# Patient Record
Sex: MALE | Race: WHITE | NOT HISPANIC OR LATINO | Employment: FULL TIME | URBAN - METROPOLITAN AREA
[De-identification: names, ages, dates, MRNs, and addresses within clinical notes are randomized per-mention and may not be internally consistent; named-entity substitution may affect disease eponyms.]

---

## 2020-01-08 ENCOUNTER — OFFICE VISIT (OUTPATIENT)
Dept: PRIMARY CARE CLINIC | Facility: CLINIC | Age: 30
End: 2020-01-08
Payer: COMMERCIAL

## 2020-01-08 VITALS
TEMPERATURE: 99 F | OXYGEN SATURATION: 99 % | HEART RATE: 72 BPM | RESPIRATION RATE: 18 BRPM | HEIGHT: 69 IN | DIASTOLIC BLOOD PRESSURE: 80 MMHG | SYSTOLIC BLOOD PRESSURE: 140 MMHG | BODY MASS INDEX: 34.96 KG/M2 | WEIGHT: 236 LBS

## 2020-01-08 DIAGNOSIS — E66.9 OBESITY, UNSPECIFIED CLASSIFICATION, UNSPECIFIED OBESITY TYPE, UNSPECIFIED WHETHER SERIOUS COMORBIDITY PRESENT: ICD-10-CM

## 2020-01-08 DIAGNOSIS — K21.9 GASTROESOPHAGEAL REFLUX DISEASE, ESOPHAGITIS PRESENCE NOT SPECIFIED: Primary | ICD-10-CM

## 2020-01-08 PROCEDURE — 3008F PR BODY MASS INDEX (BMI) DOCUMENTED: ICD-10-PCS | Mod: CPTII,S$GLB,, | Performed by: FAMILY MEDICINE

## 2020-01-08 PROCEDURE — 99999 PR PBB SHADOW E&M-NEW PATIENT-LVL III: ICD-10-PCS | Mod: PBBFAC,,, | Performed by: FAMILY MEDICINE

## 2020-01-08 PROCEDURE — 3008F BODY MASS INDEX DOCD: CPT | Mod: CPTII,S$GLB,, | Performed by: FAMILY MEDICINE

## 2020-01-08 PROCEDURE — 99999 PR PBB SHADOW E&M-NEW PATIENT-LVL III: CPT | Mod: PBBFAC,,, | Performed by: FAMILY MEDICINE

## 2020-01-08 PROCEDURE — 99213 PR OFFICE/OUTPT VISIT, EST, LEVL III, 20-29 MIN: ICD-10-PCS | Mod: S$GLB,,, | Performed by: FAMILY MEDICINE

## 2020-01-08 PROCEDURE — 99213 OFFICE O/P EST LOW 20 MIN: CPT | Mod: S$GLB,,, | Performed by: FAMILY MEDICINE

## 2020-01-08 RX ORDER — OMEPRAZOLE 40 MG/1
40 CAPSULE, DELAYED RELEASE ORAL DAILY
Qty: 30 CAPSULE | Refills: 5 | Status: SHIPPED | OUTPATIENT
Start: 2020-01-08 | End: 2020-04-14 | Stop reason: SDUPTHER

## 2020-01-08 NOTE — PROGRESS NOTES
Subjective:       Patient ID: Willie Luke is a 29 y.o. male.    Chief Complaint: Establish Care and Gastroesophageal Reflux    HPI:  29-year-old white male in for new PCP complaining of GERD.  Usually heartburn especially at night--usually when patient is trying to sleep.  Also occasionally chokes patient calls and cough.  Patient not drink alcohol.Eating well, +BM +ambulating well    ROS:  Skin: no psoriasis, eczema, skin cancer-  HEENT: No headache, ocular pain, blurred vision, diplopia, epistaxis, hoarseness change in voice, thyroid trouble  Lung: No pneumonia, asthma, Tb, wheezing, SOB, no smoking  Heart: No chest pain, ankle edema, palpitations, MI, heron murmur, hypertension, hyperlipidemia  Abdomen: No nausea, vomiting, diarrhea, constipation, ulcers, hepatitis, gallbladder disease, melena, hematochezia, hematemesis  : no UTI, renal disease, stones no venereal disease  MS: no fractures, O/A, lupus, rheumatoid, gout  Neuro: No dizziness, LOC, seizures   No diabetes, no anemia, no anxiety, no depression   Single, no children, work  [employer],lives with mother and sister   Objective:   Physical Exam:  General: Well nourished, well developed, no acute distress +obesity  Skin: No lesions  HEENT: Eyes PERRLA, EOM intact, nose patent, throat non-erythematous ears TMs clear  NECK: Supple, no bruits, No JVD, no nodes  Lungs: Clear, no rales, rhonchi, wheezing  Heart: Regular rate and rhythm, no murmurs, gallops, or rubs  Abdomen: flat, bowel sounds positive, no tenderness, or organomegaly  MS: Range of motion and muscle strength intact  Neuro: Alert, CN intact, oriented X 3  Extremities: No cyanosis, clubbing, or edema         Assessment:       1. Gastroesophageal reflux disease, esophagitis presence not specified    2. Obesity, unspecified classification, unspecified obesity type, unspecified whether serious comorbidity present        Plan:       Gastroesophageal reflux disease, esophagitis  presence not specified  -     CBC auto differential; Future; Expected date: 01/08/2020  -     Comprehensive metabolic panel; Future; Expected date: 01/08/2020  -     EKG 12-lead; Future  -     Fecal Immunochemical Test (iFOBT); Future; Expected date: 01/08/2020  -     Lipid panel; Future; Expected date: 01/08/2020  -     X-Ray Chest PA And Lateral; Future; Expected date: 01/08/2020  -     POCT urine dipstick without microscope  -     T4, free; Future; Expected date: 01/08/2020  -     TSH; Future; Expected date: 01/08/2020    Obesity, unspecified classification, unspecified obesity type, unspecified whether serious comorbidity present    Other orders  -     omeprazole (PRILOSEC) 40 MG capsule; Take 1 capsule (40 mg total) by mouth once daily.  Dispense: 30 capsule; Refill: 5      GERD--raise head of the bed on 2 blocks of would to help prevent reflux---try not to eat for 4-6 hours prior to laying supine---avoid alcohol smoking NSAIDs caffeine stress and carbonated drinks  Omeprazole 40 mg 1 p.o. q.h.s. to decrease acid may by stomach--if needed could add 2 Tums q.h.s. to neutral eyes acid that is already there--if severe pain could add Carafate  Obesity--exercise/try to get ideal body weight routine lab CBCs CMP lipids T4 TSH stool guaiac UA chest x-ray EKG is physical  Health maintenance lipids tetanus flu

## 2020-01-20 ENCOUNTER — TELEPHONE (OUTPATIENT)
Dept: PRIMARY CARE CLINIC | Facility: CLINIC | Age: 30
End: 2020-01-20

## 2020-01-20 NOTE — TELEPHONE ENCOUNTER
----- Message from Ana Coker sent at 1/20/2020  3:28 PM CST -----  Contact:  Ella (aunt) [phone]  Ella wants to schedule the patient lab appointment at the location in Dr. No office. Ella state the reason for the patient wanting to go into the office to have the blood drawn is because there is no cost and if it's done in the hospital, he will have a fee to pay. Please call and advise.

## 2020-01-23 ENCOUNTER — CLINICAL SUPPORT (OUTPATIENT)
Dept: PRIMARY CARE CLINIC | Facility: CLINIC | Age: 30
End: 2020-01-23
Payer: COMMERCIAL

## 2020-01-23 DIAGNOSIS — K21.9 GASTROESOPHAGEAL REFLUX DISEASE, ESOPHAGITIS PRESENCE NOT SPECIFIED: ICD-10-CM

## 2020-01-23 LAB
ALBUMIN SERPL BCP-MCNC: 5.1 G/DL (ref 3.5–5.2)
ALP SERPL-CCNC: 48 U/L (ref 38–126)
ALT SERPL W/O P-5'-P-CCNC: 51 U/L (ref 17–63)
ANION GAP SERPL CALC-SCNC: 14 MMOL/L (ref 8–16)
AST SERPL-CCNC: 22 U/L (ref 15–41)
BASOPHILS # BLD AUTO: 0 K/UL (ref 0–0.2)
BASOPHILS NFR BLD: 0.4 % (ref 0–1.9)
BILIRUB SERPL-MCNC: 1 MG/DL (ref 0.3–1.2)
BUN SERPL-MCNC: 19 MG/DL (ref 6–20)
CALCIUM SERPL-MCNC: 9.7 MG/DL (ref 8.6–10)
CHLORIDE SERPL-SCNC: 101 MMOL/L (ref 101–111)
CHOLEST SERPL-MCNC: 153 MG/DL (ref 80–200)
CHOLEST/HDLC SERPL: 3.7 {RATIO} (ref 2–5)
CO2 SERPL-SCNC: 20 MMOL/L (ref 23–29)
CREAT SERPL-MCNC: 1 MG/DL (ref 0.5–1.4)
DIFFERENTIAL METHOD: ABNORMAL
EOSINOPHIL # BLD AUTO: 0 K/UL (ref 0–0.5)
EOSINOPHIL NFR BLD: 0.4 % (ref 0–8)
ERYTHROCYTE [DISTWIDTH] IN BLOOD BY AUTOMATED COUNT: 12.8 % (ref 11.5–14.5)
EST. GFR  (AFRICAN AMERICAN): >60 ML/MIN/1.73 M^2
EST. GFR  (NON AFRICAN AMERICAN): >60 ML/MIN/1.73 M^2
GLUCOSE SERPL-MCNC: 76 MG/DL (ref 74–118)
HCT VFR BLD AUTO: 46.7 % (ref 40–54)
HDLC SERPL-MCNC: 41 MG/DL (ref 40–75)
HDLC SERPL: 26.8 % (ref 20–50)
HGB BLD-MCNC: 15.9 G/DL (ref 14–18)
LDLC SERPL CALC-MCNC: 101 MG/DL
LYMPHOCYTES # BLD AUTO: 0.9 K/UL (ref 1–4.8)
LYMPHOCYTES NFR BLD: 11.5 % (ref 18–48)
MCH RBC QN AUTO: 31.5 PG (ref 27–31)
MCHC RBC AUTO-ENTMCNC: 34 G/DL (ref 32–36)
MCV RBC AUTO: 93 FL (ref 82–98)
MONOCYTES # BLD AUTO: 0.4 K/UL (ref 0.3–1)
MONOCYTES NFR BLD: 5.6 % (ref 4–15)
NEUTROPHILS # BLD AUTO: 6.1 K/UL (ref 1.8–7.7)
NEUTROPHILS NFR BLD: 82.1 % (ref 38–73)
NONHDLC SERPL-MCNC: 112 MG/DL
PLATELET # BLD AUTO: 234 K/UL (ref 150–350)
PMV BLD AUTO: 8.4 FL (ref 9.2–12.9)
POTASSIUM SERPL-SCNC: 4.3 MMOL/L (ref 3.5–5.1)
PROT SERPL-MCNC: 7.9 G/DL (ref 6–8.4)
RBC # BLD AUTO: 5.04 M/UL (ref 4.6–6.2)
SODIUM SERPL-SCNC: 135 MMOL/L (ref 136–145)
T4 FREE SERPL-MCNC: 1.11 NG/DL (ref 0.61–1.12)
TRIGL SERPL-MCNC: 57 MG/DL (ref 30–150)
TSH SERPL DL<=0.005 MIU/L-ACNC: 0.45 UIU/ML (ref 0.45–5.33)
WBC # BLD AUTO: 7.5 K/UL (ref 3.9–12.7)

## 2020-01-23 PROCEDURE — 80053 COMPREHEN METABOLIC PANEL: CPT

## 2020-01-23 PROCEDURE — 84439 ASSAY OF FREE THYROXINE: CPT

## 2020-01-23 PROCEDURE — 84443 ASSAY THYROID STIM HORMONE: CPT

## 2020-01-23 PROCEDURE — 36415 PR COLLECTION VENOUS BLOOD,VENIPUNCTURE: ICD-10-PCS | Mod: S$GLB,,, | Performed by: FAMILY MEDICINE

## 2020-01-23 PROCEDURE — 36415 COLL VENOUS BLD VENIPUNCTURE: CPT | Mod: S$GLB,,, | Performed by: FAMILY MEDICINE

## 2020-01-23 PROCEDURE — 85025 COMPLETE CBC W/AUTO DIFF WBC: CPT

## 2020-01-23 PROCEDURE — 80061 LIPID PANEL: CPT

## 2020-03-06 ENCOUNTER — OFFICE VISIT (OUTPATIENT)
Dept: PRIMARY CARE CLINIC | Facility: CLINIC | Age: 30
End: 2020-03-06
Payer: COMMERCIAL

## 2020-03-06 VITALS
HEIGHT: 69 IN | TEMPERATURE: 98 F | OXYGEN SATURATION: 99 % | SYSTOLIC BLOOD PRESSURE: 136 MMHG | WEIGHT: 217.38 LBS | DIASTOLIC BLOOD PRESSURE: 84 MMHG | HEART RATE: 103 BPM | BODY MASS INDEX: 32.2 KG/M2 | RESPIRATION RATE: 18 BRPM

## 2020-03-06 DIAGNOSIS — N50.3 EPIDIDYMAL CYST: ICD-10-CM

## 2020-03-06 DIAGNOSIS — K21.9 GASTROESOPHAGEAL REFLUX DISEASE, ESOPHAGITIS PRESENCE NOT SPECIFIED: Primary | ICD-10-CM

## 2020-03-06 DIAGNOSIS — N50.819 EPIDIDYMAL PAIN: ICD-10-CM

## 2020-03-06 PROCEDURE — 99999 PR PBB SHADOW E&M-EST. PATIENT-LVL III: CPT | Mod: PBBFAC,,, | Performed by: FAMILY MEDICINE

## 2020-03-06 PROCEDURE — 3008F PR BODY MASS INDEX (BMI) DOCUMENTED: ICD-10-PCS | Mod: CPTII,S$GLB,, | Performed by: FAMILY MEDICINE

## 2020-03-06 PROCEDURE — 99213 OFFICE O/P EST LOW 20 MIN: CPT | Mod: S$GLB,,, | Performed by: FAMILY MEDICINE

## 2020-03-06 PROCEDURE — 99999 PR PBB SHADOW E&M-EST. PATIENT-LVL III: ICD-10-PCS | Mod: PBBFAC,,, | Performed by: FAMILY MEDICINE

## 2020-03-06 PROCEDURE — 3008F BODY MASS INDEX DOCD: CPT | Mod: CPTII,S$GLB,, | Performed by: FAMILY MEDICINE

## 2020-03-06 PROCEDURE — 99213 PR OFFICE/OUTPT VISIT, EST, LEVL III, 20-29 MIN: ICD-10-PCS | Mod: S$GLB,,, | Performed by: FAMILY MEDICINE

## 2020-03-06 RX ORDER — DOXYCYCLINE 100 MG/1
100 CAPSULE ORAL EVERY 12 HOURS
Qty: 20 CAPSULE | Refills: 0 | Status: SHIPPED | OUTPATIENT
Start: 2020-03-06 | End: 2020-03-16

## 2020-03-06 NOTE — PROGRESS NOTES
Subjective:       Patient ID: Willie Luke is a 29 y.o. male.    Chief Complaint: Groin Pain (left side )    HPI:  29-year-old white male --patient in for left groin pain---started 9 days ago--only trauma patient can think of is at the dog jumped on him in bed-- with the pain exist--pain radiates into the back and into the buttock and into the scrotum---patient was checking himself after gurgling on the Internet--for testicular mass--felt that he might of felt something--lower back side of the left testicle.  Work [employer]--does a lot of lifting of heavy objects.  No history of a hernia in the past.       Last visit had history of GERD states doing better       A lot of stress since Wednesday--a lot of anxiety--worried about testicle and groin area pain    ROS:  Skin: no psoriasis, eczema, skin cancer-  HEENT: No headache, ocular pain, blurred vision, diplopia, epistaxis, hoarseness change in voice, thyroid trouble  Lung: No pneumonia, asthma, Tb, wheezing, SOB, no smoking   Heart: No chest pain, ankle edema, palpitations, MI, heron murmur, hypertension, hyperlipidemia  Abdomen:  +history GERD + occas  nausea with eating certain foods, no  vomiting, diarrhea, constipation, ulcers, hepatitis, gallbladder disease, melena, hematochezia, hematemesis  : no UTI, renal disease, stones no venereal disease  MS: no fractures, O/A, lupus, rheumatoid, gout  Neuro: No dizziness, LOC, seizures   No diabetes, no anemia, + anxiety, no depression   Single, no children, work  [employer],lives with mother and sister   Objective:   Physical Exam:  General: Well nourished, well developed, no acute distress +obesity  Skin: No lesions  HEENT: Eyes PERRLA, EOM intact, nose patent, throat non-erythematous ears TMs clear  NECK: Supple, no bruits, No JVD, no nodes  Lungs: Clear, no rales, rhonchi, wheezing  Heart: Regular rate and rhythm, no murmurs, gallops, or rubs  Abdomen: flat, bowel sounds positive, no  tenderness, or organomegaly\  Genitalia--no hernia--left testicle--some swelling in the inferior posterior epididymal area--approximately 1 cm--feels like a hydrocele--minimal tenderness to touch--overall no significant pain  MS: Range of motion and muscle strength intact full range of motion of the back  Neuro: Alert, CN intact, oriented X 3  Extremities: No cyanosis, clubbing, or edema         Assessment:       1. Gastroesophageal reflux disease, esophagitis presence not specified    2. Epididymal pain    3. Epididymal cyst        Plan:       Gastroesophageal reflux disease, esophagitis presence not specified    Epididymal pain  -     US Scrotum And Testicles; Future; Expected date: 03/06/2020    Epididymal cyst  -     US Scrotum And Testicles; Future; Expected date: 03/06/2020    Other orders  -     doxycycline (VIBRAMYCIN) 100 MG Cap; Take 1 capsule (100 mg total) by mouth every 12 (twelve) hours. for 10 days  Dispense: 20 capsule; Refill: 0        Left inferior posterior epididymal swelling---feels like a cystic area approximately 1 cm posterior inferior area---no hernia--no inguinal pain--will get testicular ultrasound  Hx  GERD--raise head of the bed on 2 blocks of would to help prevent reflux---try not to eat for 4-6 hours prior to laying supine---avoid alcohol smoking NSAIDs caffeine stress and carbonated drinks  Omeprazole 40 mg 1 p.o. q.h.s.prn  Patient did recent labs in January CBCs CMP lipid T4 TSH all within normal limits--no chest x-ray EKG done  Health maintenance HIV tetanus flu  Patient given prescription doxycycline told if pain increases could start doxycycline 100 b.i.d. to cover epididymitis no need to do that at this time patient appears to have hydrocele or spermatocele

## 2020-04-14 ENCOUNTER — OFFICE VISIT (OUTPATIENT)
Dept: PRIMARY CARE CLINIC | Facility: CLINIC | Age: 30
End: 2020-04-14
Payer: COMMERCIAL

## 2020-04-14 ENCOUNTER — TELEPHONE (OUTPATIENT)
Dept: PRIMARY CARE CLINIC | Facility: CLINIC | Age: 30
End: 2020-04-14

## 2020-04-14 DIAGNOSIS — R10.13 EPIGASTRIC PAIN: ICD-10-CM

## 2020-04-14 DIAGNOSIS — E66.9 OBESITY, UNSPECIFIED CLASSIFICATION, UNSPECIFIED OBESITY TYPE, UNSPECIFIED WHETHER SERIOUS COMORBIDITY PRESENT: ICD-10-CM

## 2020-04-14 DIAGNOSIS — R10.12 LEFT UPPER QUADRANT PAIN: ICD-10-CM

## 2020-04-14 DIAGNOSIS — K21.9 GASTROESOPHAGEAL REFLUX DISEASE, ESOPHAGITIS PRESENCE NOT SPECIFIED: Primary | ICD-10-CM

## 2020-04-14 PROCEDURE — 99213 PR OFFICE/OUTPT VISIT, EST, LEVL III, 20-29 MIN: ICD-10-PCS | Mod: 95,,, | Performed by: FAMILY MEDICINE

## 2020-04-14 PROCEDURE — 99213 OFFICE O/P EST LOW 20 MIN: CPT | Mod: 95,,, | Performed by: FAMILY MEDICINE

## 2020-04-14 RX ORDER — OMEPRAZOLE 40 MG/1
40 CAPSULE, DELAYED RELEASE ORAL DAILY
Qty: 30 CAPSULE | Refills: 5 | Status: SHIPPED | OUTPATIENT
Start: 2020-04-14 | End: 2020-06-16

## 2020-04-14 NOTE — PROGRESS NOTES
Subjective:       Patient ID: Willie Luke is a 29 y.o. male.    Chief Complaint: No chief complaint on file.    HPI: The patient location is:  home  The chief complaint leading to consultation is:  Abdominal pain  Visit type: audiovisual  Total time spent with patient:  15 min  Each patient to whom he or she provides medical services by telemedicine is:  (1) informed of the relationship between the physician and patient and the respective role of any other health care provider with respect to management of the patient; and (2) notified that he or she may decline to receive medical services by telemedicine and may withdraw from such care at any time.    Notes:  History of present illness 29-year-old white male --abdominal pain--occasionally in the epigastric area but mainly in the left costal angle mainly in the midclavicular line occasionally radiates to the back present x4 weeks---no trauma no excessive activity---no nausea vomiting diarrhea constipation---no ulcers hepatitis gallbladder disease---no melena hematochezia hematemesis  Patient usually sleeps face down--unable to lay that on the stomach due to the pain in the left upper quadrant.  No pneumonia asthma TB  Right no  symptoms no frequency urgency hesitancy hematuria pyuria prostate problems is no kidney stones no venereal disease  --muscle skeletal no fractures--no lupus rheumatoid gout  History of GERD past    ROS:  Skin: no psoriasis, eczema, skin cancer-  HEENT: No headache, ocular pain, blurred vision, diplopia, epistaxis, hoarseness change in voice, thyroid trouble  Lung: No pneumonia, asthma, Tb, wheezing, SOB, no smoking   Heart: No chest pain, ankle edema, palpitations, MI, heron murmur, hypertension, hyperlipidemia  Abdomen:  +history GERD + occas  nausea with eating certain foods, no  vomiting, diarrhea, constipation, ulcers, hepatitis, gallbladder disease, melena, hematochezia, hematemesis  : no UTI, renal disease, stones no venereal  disease  MS: no fractures, O/A, lupus, rheumatoid, gout  Neuro: No dizziness, LOC, seizures   No diabetes, no anemia, + anxiety, no depression   Single, no children, work  [employer],lives with mother and sister   Objective:   Physical Exam:  No physical exam done this was a telemedicine visit  General: Well nourished, well developed, no acute distress +obesity  Skin: No lesions  HEENT: Eyes PERRLA, EOM intact, nose patent, throat non-erythematous ears TMs clear  NECK: Supple, no bruits, No JVD, no nodes  Lungs: Clear, no rales, rhonchi, wheezing  Heart: Regular rate and rhythm, no murmurs, gallops, or rubs  Abdomen: flat, bowel sounds positive, no tenderness, or organomegaly\  Genitalia--no hernia--left testicle--some swelling in the inferior posterior epididymal area--approximately 1 cm--feels like a hydrocele--minimal tenderness to touch--overall no significant pain  MS: Range of motion and muscle strength intact full range of motion of the back  Neuro: Alert, CN intact, oriented X 3  Extremities: No cyanosis, clubbing, or edema         Assessment:       1. Gastroesophageal reflux disease, esophagitis presence not specified    2. Left upper quadrant pain    3. Epigastric pain    4. Obesity, unspecified classification, unspecified obesity type, unspecified whether serious comorbidity present        Plan:       Gastroesophageal reflux disease, esophagitis presence not specified  -     CBC auto differential; Future; Expected date: 04/14/2020  -     Comprehensive metabolic panel; Future; Expected date: 04/14/2020  -     POCT urine dipstick without microscope  -     Fecal Immunochemical Test (iFOBT); Future; Expected date: 04/14/2020  -     EKG 12-lead; Future  -     X-Ray Chest PA And Lateral; Future; Expected date: 04/14/2020  -     CT Abdomen Pelvis W Wo Contrast; Future; Expected date: 04/14/2020  -     CT Abdomen Pelvis W Wo Contrast; Future; Expected date: 04/15/2020    Left upper quadrant pain  -      CT Abdomen Pelvis W Wo Contrast; Future; Expected date: 04/14/2020    Epigastric pain  -     CT Abdomen Pelvis W Wo Contrast; Future; Expected date: 04/14/2020    Obesity, unspecified classification, unspecified obesity type, unspecified whether serious comorbidity present    Other orders  -     omeprazole (PRILOSEC) 40 MG capsule; Take 1 capsule (40 mg total) by mouth once daily.  Dispense: 30 capsule; Refill: 5        Abdominal pain--mainly in the left costal angle midclavicular line--occasionally radiates to the epigastric area--occasionally radiates to the back---unable lay on abdomen--is a dull ache---CT scan abdomen and pelvis--- CBC,CMP,u/A stool guaiac---if wants a full physical could do chest x-ray EKG  Omeprazole 40 q.h.s.--Tums 2 p.o. 1 hr p.c. breakfast and 1 hr p.c. Supper-  History of GERD---avoid smoking alcohol NSAIDs caffeine stress carbonated drinks--when taking NSAIDs be sure to take omeprazole at the same time  Had lab in January  Health maintenance HIV tetanus flu  The workup fails in abdominal pain persists patient may need colonoscopy/EGD in GI consult

## 2020-04-16 ENCOUNTER — TELEPHONE (OUTPATIENT)
Dept: PRIMARY CARE CLINIC | Facility: CLINIC | Age: 30
End: 2020-04-16

## 2020-04-16 NOTE — TELEPHONE ENCOUNTER
----- Message from Nya Daley sent at 4/16/2020  9:15 AM CDT -----  Dr No has stat CT of abd look at dx let me know if I need to schedule Thanks

## 2020-04-21 ENCOUNTER — PATIENT MESSAGE (OUTPATIENT)
Dept: PRIMARY CARE CLINIC | Facility: CLINIC | Age: 30
End: 2020-04-21

## 2020-05-28 ENCOUNTER — OFFICE VISIT (OUTPATIENT)
Dept: PRIMARY CARE CLINIC | Facility: CLINIC | Age: 30
End: 2020-05-28
Payer: COMMERCIAL

## 2020-05-28 VITALS
OXYGEN SATURATION: 98 % | BODY MASS INDEX: 32.93 KG/M2 | WEIGHT: 222.31 LBS | DIASTOLIC BLOOD PRESSURE: 82 MMHG | RESPIRATION RATE: 18 BRPM | TEMPERATURE: 97 F | SYSTOLIC BLOOD PRESSURE: 140 MMHG | HEIGHT: 69 IN | HEART RATE: 65 BPM

## 2020-05-28 DIAGNOSIS — K21.9 GASTROESOPHAGEAL REFLUX DISEASE, ESOPHAGITIS PRESENCE NOT SPECIFIED: ICD-10-CM

## 2020-05-28 DIAGNOSIS — E66.9 OBESITY, UNSPECIFIED CLASSIFICATION, UNSPECIFIED OBESITY TYPE, UNSPECIFIED WHETHER SERIOUS COMORBIDITY PRESENT: ICD-10-CM

## 2020-05-28 DIAGNOSIS — J02.9 PHARYNGITIS, UNSPECIFIED ETIOLOGY: Primary | ICD-10-CM

## 2020-05-28 PROCEDURE — 96372 THER/PROPH/DIAG INJ SC/IM: CPT | Mod: S$GLB,,, | Performed by: FAMILY MEDICINE

## 2020-05-28 PROCEDURE — 99999 PR PBB SHADOW E&M-EST. PATIENT-LVL III: CPT | Mod: PBBFAC,,, | Performed by: FAMILY MEDICINE

## 2020-05-28 PROCEDURE — 3008F PR BODY MASS INDEX (BMI) DOCUMENTED: ICD-10-PCS | Mod: CPTII,S$GLB,, | Performed by: FAMILY MEDICINE

## 2020-05-28 PROCEDURE — 96372 PR INJECTION,THERAP/PROPH/DIAG2ST, IM OR SUBCUT: ICD-10-PCS | Mod: S$GLB,,, | Performed by: FAMILY MEDICINE

## 2020-05-28 PROCEDURE — 99213 PR OFFICE/OUTPT VISIT, EST, LEVL III, 20-29 MIN: ICD-10-PCS | Mod: 25,S$GLB,, | Performed by: FAMILY MEDICINE

## 2020-05-28 PROCEDURE — 3008F BODY MASS INDEX DOCD: CPT | Mod: CPTII,S$GLB,, | Performed by: FAMILY MEDICINE

## 2020-05-28 PROCEDURE — 99999 PR PBB SHADOW E&M-EST. PATIENT-LVL III: ICD-10-PCS | Mod: PBBFAC,,, | Performed by: FAMILY MEDICINE

## 2020-05-28 PROCEDURE — 99213 OFFICE O/P EST LOW 20 MIN: CPT | Mod: 25,S$GLB,, | Performed by: FAMILY MEDICINE

## 2020-05-28 RX ORDER — CEFDINIR 300 MG/1
300 CAPSULE ORAL 2 TIMES DAILY
Qty: 20 CAPSULE | Refills: 0 | Status: SHIPPED | OUTPATIENT
Start: 2020-05-28 | End: 2020-06-07

## 2020-05-28 RX ORDER — METHYLPREDNISOLONE 4 MG/1
TABLET ORAL
Qty: 1 PACKAGE | Refills: 0 | Status: SHIPPED | OUTPATIENT
Start: 2020-05-28 | End: 2020-06-16

## 2020-05-28 RX ORDER — TRIAMCINOLONE ACETONIDE 40 MG/ML
40 INJECTION, SUSPENSION INTRA-ARTICULAR; INTRAMUSCULAR ONCE
Status: COMPLETED | OUTPATIENT
Start: 2020-05-28 | End: 2020-05-28

## 2020-05-28 RX ADMIN — TRIAMCINOLONE ACETONIDE 40 MG: 40 INJECTION, SUSPENSION INTRA-ARTICULAR; INTRAMUSCULAR at 11:05

## 2020-05-28 NOTE — PROGRESS NOTES
Patient identified by name and date of birth. Denies any allergies, injection administered as ordered by aseptic technique, tolerated well by pt.

## 2020-05-28 NOTE — PROGRESS NOTES
Subjective:       Patient ID: Willie Luke is a 29 y.o. male.    Chief Complaint: Sore Throat and Otalgia    HPI: 28 yo WM -- sick  x 5-7 days-- no fever chills or sweats--- no runny stuffy nose-- +sore throat-- no cough--- left ear pain.   No pneumonia asthma TB-- no smoking   no exposure to monitor.. No COVID exposure.    ROS:  Skin: no psoriasis, eczema, skin cancer  HEENT: No headache, ocular pain, blurred vision, diplopia, epistaxis, hoarseness change in voice, thyroid trouble  Lung: No pneumonia, asthma, Tb, wheezing, SOB, no smoking   Heart: No chest pain, ankle edema, palpitations, MI, heron murmur, hypertension, hyperlipidemia  Abdomen:  +history GERD +  Comes and, no  vomiting, diarrhea, constipation, ulcers, hepatitis, gallbladder disease, melena, hematochezia, hematemesis  : no UTI, renal disease, stones no venereal disease  MS: no fractures, O/A, lupus, rheumatoid, gout  Neuro: No dizziness, LOC, seizures   No diabetes, no anemia, + anxiety, no depression   Single, no children, work  [employer],lives with mother and sister   Objective:   Physical Exam:   General: Well nourished, well developed, no acute distress +obesity  Skin: No lesions  HEENT: Eyes PERRLA, EOM intact, nose patent, throat  1/4erythematous ears-- 2 pustular circular 0.2 cm lesions left tonsillar pillar area TMs clear  NECK: Supple, no bruits, No JVD, no nodes  Lungs: Clear, no rales, rhonchi, wheezing  Heart: Regular rate and rhythm, no murmurs, gallops, or rubs  Abdomen: flat, bowel sounds positive, no tenderness, or organomegaly\  Genitalia--no hernia--left testicle--some swelling in the inferior posterior epididymal area--approximately 1 cm--feels like a hydrocele--minimal tenderness to touch--overall no significant pain  MS: Range of motion and muscle strength intact full range of motion of the back  Neuro: Alert, CN intact, oriented X 3  Extremities: No cyanosis, clubbing, or edema         Assessment:        1. Pharyngitis, unspecified etiology    2. Gastroesophageal reflux disease, esophagitis presence not specified    3. Obesity, unspecified classification, unspecified obesity type, unspecified whether serious comorbidity present        Plan:       Pharyngitis, unspecified etiology    Gastroesophageal reflux disease, esophagitis presence not specified    Obesity, unspecified classification, unspecified obesity type, unspecified whether serious comorbidity present    Other orders  -     cefdinir (OMNICEF) 300 MG capsule; Take 1 capsule (300 mg total) by mouth 2 (two) times daily. for 10 days  Dispense: 20 capsule; Refill: 0  -     methylPREDNISolone (MEDROL DOSEPACK) 4 mg tablet; use as directed  Dispense: 1 Package; Refill: 0  -     triamcinolone acetonide injection 40 mg       Pharyngitis-- to posture lesions in the left tonsillar pillar area-- strep screen-- Kenalog/Medrol Dosepak / Omnicef 300 b.i.d. Times 10 days stop if gets diarrhea   history GERD if desires can get omeprazole   history anxiety   [facility]   health maintenance tetanus HIV patent  May want to return after well to get tetanus shot

## 2020-06-15 ENCOUNTER — TELEPHONE (OUTPATIENT)
Dept: PRIMARY CARE CLINIC | Facility: CLINIC | Age: 30
End: 2020-06-15

## 2020-06-15 NOTE — TELEPHONE ENCOUNTER
Spoke to patient and scheduled him an appointment with Farida Henderson on 06/15. Patient stated understanding

## 2020-06-15 NOTE — TELEPHONE ENCOUNTER
----- Message from Namrata Martinez sent at 6/15/2020  4:20 PM CDT -----  Contact: Mother [phone]  Caller is requesting an earlier appt than we can schedule.  Caller declined first available appointment listed below. Caller will not accept being placed on the wait list and is requesting a message be sent to the provider.  When is the next available appointment:  06/22/2020  Did you offer to schedule the next available appt and put the patient on the wait list?:   yes/no  What visit type: ep  Symptoms:  ear and throat pain/anxiety  Patient preference of timeframe to be scheduled:  asap  What is the reason the patient is requesting a sooner appointment? (insurance terminating, changing jobs):    Would the patient rather a call back or a response via [facility]?:  call back  Comments:

## 2020-06-16 ENCOUNTER — OFFICE VISIT (OUTPATIENT)
Dept: PRIMARY CARE CLINIC | Facility: CLINIC | Age: 30
End: 2020-06-16
Payer: COMMERCIAL

## 2020-06-16 VITALS
WEIGHT: 224.88 LBS | RESPIRATION RATE: 18 BRPM | BODY MASS INDEX: 33.31 KG/M2 | DIASTOLIC BLOOD PRESSURE: 66 MMHG | HEART RATE: 68 BPM | SYSTOLIC BLOOD PRESSURE: 128 MMHG | TEMPERATURE: 98 F | HEIGHT: 69 IN | OXYGEN SATURATION: 99 %

## 2020-06-16 DIAGNOSIS — J02.9 PHARYNGITIS, UNSPECIFIED ETIOLOGY: ICD-10-CM

## 2020-06-16 DIAGNOSIS — F41.9 ANXIETY: Primary | ICD-10-CM

## 2020-06-16 PROBLEM — R10.12 LEFT UPPER QUADRANT PAIN: Status: RESOLVED | Noted: 2020-04-14 | Resolved: 2020-06-16

## 2020-06-16 PROBLEM — N50.819 EPIDIDYMAL PAIN: Status: RESOLVED | Noted: 2020-03-06 | Resolved: 2020-06-16

## 2020-06-16 PROCEDURE — 99999 PR PBB SHADOW E&M-EST. PATIENT-LVL IV: CPT | Mod: PBBFAC,,, | Performed by: NURSE PRACTITIONER

## 2020-06-16 PROCEDURE — 99213 OFFICE O/P EST LOW 20 MIN: CPT | Mod: S$GLB,,, | Performed by: NURSE PRACTITIONER

## 2020-06-16 PROCEDURE — 3008F BODY MASS INDEX DOCD: CPT | Mod: CPTII,S$GLB,, | Performed by: NURSE PRACTITIONER

## 2020-06-16 PROCEDURE — 99213 PR OFFICE/OUTPT VISIT, EST, LEVL III, 20-29 MIN: ICD-10-PCS | Mod: S$GLB,,, | Performed by: NURSE PRACTITIONER

## 2020-06-16 PROCEDURE — 3008F PR BODY MASS INDEX (BMI) DOCUMENTED: ICD-10-PCS | Mod: CPTII,S$GLB,, | Performed by: NURSE PRACTITIONER

## 2020-06-16 PROCEDURE — 99999 PR PBB SHADOW E&M-EST. PATIENT-LVL IV: ICD-10-PCS | Mod: PBBFAC,,, | Performed by: NURSE PRACTITIONER

## 2020-08-31 ENCOUNTER — LAB VISIT (OUTPATIENT)
Dept: SURGERY | Facility: CLINIC | Age: 30
End: 2020-08-31
Payer: COMMERCIAL

## 2020-08-31 ENCOUNTER — TELEPHONE (OUTPATIENT)
Dept: SURGERY | Facility: CLINIC | Age: 30
End: 2020-08-31

## 2020-08-31 DIAGNOSIS — Z11.59 SCREENING FOR VIRAL DISEASE: Primary | ICD-10-CM

## 2020-08-31 PROCEDURE — U0003 INFECTIOUS AGENT DETECTION BY NUCLEIC ACID (DNA OR RNA); SEVERE ACUTE RESPIRATORY SYNDROME CORONAVIRUS 2 (SARS-COV-2) (CORONAVIRUS DISEASE [COVID-19]), AMPLIFIED PROBE TECHNIQUE, MAKING USE OF HIGH THROUGHPUT TECHNOLOGIES AS DESCRIBED BY CMS-2020-01-R: HCPCS

## 2020-08-31 NOTE — TELEPHONE ENCOUNTER
Called patient in reference to a referral from [facility] for Covid-19 testing. Appointment Completed.

## 2020-09-01 LAB — SARS-COV-2 RNA RESP QL NAA+PROBE: NOT DETECTED

## 2020-09-02 ENCOUNTER — TELEPHONE (OUTPATIENT)
Dept: SURGERY | Facility: CLINIC | Age: 30
End: 2020-09-02

## 2020-09-02 NOTE — TELEPHONE ENCOUNTER
Called patient tp inform of Covid 19 screening test results as reported by the laboratory, and review by Kerry Green NP. Patient informed Covid -19 screening test results are reported as Covid Not Detected. Patient was given the opportunity to ask any questions about the Covid test results.

## 2020-09-02 NOTE — TELEPHONE ENCOUNTER
Called patient tp inform of Covid 19 screening test results as reported by the laboratory, and review by Kerry Green NP. Patient informed Covid \-19 screening test results are reported as Covid Not Detected. Patient was given the opportunity to ask any questions about the Covid test results.

## 2020-09-02 NOTE — TELEPHONE ENCOUNTER
----- Message from Kerry Green NP sent at 9/2/2020  8:16 AM CDT -----  SHERICE Green NP has reviewed results.

## 2020-09-29 ENCOUNTER — OFFICE VISIT (OUTPATIENT)
Dept: PRIMARY CARE CLINIC | Facility: CLINIC | Age: 30
End: 2020-09-29
Payer: COMMERCIAL

## 2020-09-29 VITALS
BODY MASS INDEX: 33.62 KG/M2 | OXYGEN SATURATION: 98 % | TEMPERATURE: 98 F | HEIGHT: 69 IN | HEART RATE: 79 BPM | DIASTOLIC BLOOD PRESSURE: 78 MMHG | WEIGHT: 227 LBS | RESPIRATION RATE: 16 BRPM | SYSTOLIC BLOOD PRESSURE: 128 MMHG

## 2020-09-29 DIAGNOSIS — R68.84 JAW PAIN: ICD-10-CM

## 2020-09-29 DIAGNOSIS — K21.9 GASTROESOPHAGEAL REFLUX DISEASE, ESOPHAGITIS PRESENCE NOT SPECIFIED: Primary | ICD-10-CM

## 2020-09-29 DIAGNOSIS — F41.9 ANXIETY: ICD-10-CM

## 2020-09-29 PROCEDURE — 99213 OFFICE O/P EST LOW 20 MIN: CPT | Mod: S$GLB,,, | Performed by: FAMILY MEDICINE

## 2020-09-29 PROCEDURE — 99999 PR PBB SHADOW E&M-EST. PATIENT-LVL III: ICD-10-PCS | Mod: PBBFAC,,, | Performed by: FAMILY MEDICINE

## 2020-09-29 PROCEDURE — 3008F PR BODY MASS INDEX (BMI) DOCUMENTED: ICD-10-PCS | Mod: CPTII,S$GLB,, | Performed by: FAMILY MEDICINE

## 2020-09-29 PROCEDURE — 99213 PR OFFICE/OUTPT VISIT, EST, LEVL III, 20-29 MIN: ICD-10-PCS | Mod: S$GLB,,, | Performed by: FAMILY MEDICINE

## 2020-09-29 PROCEDURE — 3008F BODY MASS INDEX DOCD: CPT | Mod: CPTII,S$GLB,, | Performed by: FAMILY MEDICINE

## 2020-09-29 PROCEDURE — 99999 PR PBB SHADOW E&M-EST. PATIENT-LVL III: CPT | Mod: PBBFAC,,, | Performed by: FAMILY MEDICINE

## 2020-09-29 RX ORDER — OMEPRAZOLE 40 MG/1
40 CAPSULE, DELAYED RELEASE ORAL DAILY
Qty: 30 CAPSULE | Refills: 5 | Status: SHIPPED | OUTPATIENT
Start: 2020-09-29 | End: 2020-11-01 | Stop reason: SDUPTHER

## 2020-09-29 RX ORDER — IBUPROFEN 600 MG/1
600 TABLET ORAL EVERY 6 HOURS PRN
Qty: 60 TABLET | Refills: 5 | Status: SHIPPED | OUTPATIENT
Start: 2020-09-29 | End: 2020-11-01 | Stop reason: SDUPTHER

## 2020-09-29 RX ORDER — AMOXICILLIN 500 MG/1
500 CAPSULE ORAL EVERY 8 HOURS
Qty: 30 CAPSULE | Refills: 0 | Status: SHIPPED | OUTPATIENT
Start: 2020-09-29 | End: 2020-09-29

## 2020-09-29 RX ORDER — AMOXICILLIN 500 MG/1
500 CAPSULE ORAL EVERY 8 HOURS
Qty: 30 CAPSULE | Refills: 0 | Status: SHIPPED | OUTPATIENT
Start: 2020-09-29 | End: 2022-06-15

## 2020-09-29 RX ORDER — OMEPRAZOLE 40 MG/1
40 CAPSULE, DELAYED RELEASE ORAL DAILY
COMMUNITY
End: 2020-09-29 | Stop reason: SDUPTHER

## 2020-09-29 NOTE — PROGRESS NOTES
Subjective:       Patient ID: Willie Luke is a 29 y.o. male.    Chief Complaint: Jaw Pain (Patient c/o left sided jaw pain that radiates to left ear ) and Heartburn (Patient c/o GERD, started back on Omeprazole OTC)    HPI: 30 yo WM --history of GERD and jaw pain      Gerd--patient was feeling burning back of his throat-not regurgitating while sleeping used to be worse having coughing spells at night---started about 4 months ago.  When patient was on omeprazole symptoms stops just refilled omeprazole 2 days ago      Jaw pain left side into the ear into the left anterior cervical chain--feels like and knotted muscle--had wisdom teeth removed 8 months ago --still with a piece of bone fragment left in--on the left side with a pains located--but was not told that it need to be removed.  Dull pain.  Occasionally swells on the left especially in the left lower jaw area.     ROS:  Skin: no psoriasis, eczema, skin cancer  HEENT: No headache, ocular pain, blurred vision, diplopia, epistaxis, hoarseness change in voice, thyroid trouble +wisdom teeth removed see history of present illness  Lung: No pneumonia, asthma, Tb, wheezing, SOB, no smoking   Heart: No chest pain, ankle edema, palpitations, MI, heron murmur, hypertension, hyperlipidemia  Abdomen:  +history GERD +  Comes and, no  vomiting, diarrhea, constipation, ulcers, hepatitis, gallbladder disease, melena, hematochezia, hematemesis  : no UTI, renal disease, stones no venereal disease  MS: no fractures, O/A, lupus, rheumatoid, gout  Neuro: No dizziness, LOC, seizures   No diabetes, no anemia, + anxiety, no depression   Single, no children, work  [employer],lives with mother and sister   Objective:   Physical Exam:   General: Well nourished, well developed, no acute distress +obesity  Skin: No lesions  HEENT: Eyes PERRLA, EOM intact, nose patent, throat nonerythematous ears--  TMs clear tenderness submental area and area the submental nodes and  salivary no real TMJ symptoms teeth appear to be in good repair nose gingival swelling  NECK: Supple, no bruits, No JVD, no nodes--some discomfort in the submental area especially the left jaw from the angle of the mandible to the mental area anteriorly  Lungs: Clear, no rales, rhonchi, wheezing  Heart: Regular rate and rhythm, no murmurs, gallops, or rubs  Abdomen: flat, bowel sounds positive, no tenderness, or organomegaly\  Genitalia--no hernia--left testicle--some swelling in the inferior posterior epididymal area--approximately 1 cm--feels like a hydrocele--minimal tenderness to touch--overall no significant pain  MS: Range of motion and muscle strength intact full range of motion of the back  Neuro: Alert, CN intact, oriented X 3  Extremities: No cyanosis, clubbing, or edema         Assessment:       1. Gastroesophageal reflux disease, esophagitis presence not specified    2. Anxiety    3. Jaw pain        Plan:       Gastroesophageal reflux disease, esophagitis presence not specified    Anxiety    Jaw pain    Other orders  -     amoxicillin (AMOXIL) 500 MG capsule; Take 1 capsule (500 mg total) by mouth every 8 (eight) hours.  Dispense: 30 capsule; Refill: 0  -     ibuprofen (ADVIL,MOTRIN) 600 MG tablet; Take 1 tablet (600 mg total) by mouth every 6 (six) hours as needed for Pain.  Dispense: 60 tablet; Refill: 5       GERD--patient has responded well to omeprazole started omeprazole 2 days ago--told to take omeprazole q.h.s. x2 weeks--then q.o.d. x2 weeks then Q 3 days x2 weeks in [place] repeat p.r.n.---avoid alcohol smoking caffeine carbonated drinks stress and NSAIDs  Left jaw pain--amoxicillin to cover teeth and jaw and cell very glands 500 t.i.d.--ibuprofen 600 q.6 hours p.r.n. pain or discomfort--see dentist for Panorex view has dental appointment 3 weeks will move it up  If needs lab should do yearly or every other year CBCs CMP lipids T4 TSH stool guaiac if no chest x-ray EKG in 2-3 years add Health  maintenance see she

## 2020-11-03 RX ORDER — OMEPRAZOLE 40 MG/1
40 CAPSULE, DELAYED RELEASE ORAL DAILY
Qty: 30 CAPSULE | Refills: 5 | Status: SHIPPED | OUTPATIENT
Start: 2020-11-03 | End: 2022-06-15

## 2020-11-03 RX ORDER — IBUPROFEN 600 MG/1
600 TABLET ORAL EVERY 6 HOURS PRN
Qty: 60 TABLET | Refills: 5 | Status: SHIPPED | OUTPATIENT
Start: 2020-11-03 | End: 2020-12-07 | Stop reason: SDUPTHER

## 2020-12-09 RX ORDER — IBUPROFEN 600 MG/1
600 TABLET ORAL EVERY 6 HOURS PRN
Qty: 60 TABLET | Refills: 5 | Status: SHIPPED | OUTPATIENT
Start: 2020-12-09 | End: 2021-02-04 | Stop reason: SDUPTHER

## 2021-05-12 RX ORDER — IBUPROFEN 600 MG/1
600 TABLET ORAL EVERY 6 HOURS PRN
Qty: 60 TABLET | Refills: 5 | Status: SHIPPED | OUTPATIENT
Start: 2021-05-12 | End: 2022-06-15

## 2021-10-05 ENCOUNTER — PATIENT MESSAGE (OUTPATIENT)
Dept: ADMINISTRATIVE | Facility: HOSPITAL | Age: 31
End: 2021-10-05

## 2022-01-21 ENCOUNTER — PATIENT MESSAGE (OUTPATIENT)
Dept: ADMINISTRATIVE | Facility: HOSPITAL | Age: 32
End: 2022-01-21
Payer: COMMERCIAL

## 2022-06-15 ENCOUNTER — OFFICE VISIT (OUTPATIENT)
Dept: PRIMARY CARE CLINIC | Facility: CLINIC | Age: 32
End: 2022-06-15
Payer: COMMERCIAL

## 2022-06-15 VITALS
RESPIRATION RATE: 18 BRPM | BODY MASS INDEX: 34.84 KG/M2 | OXYGEN SATURATION: 99 % | HEART RATE: 86 BPM | DIASTOLIC BLOOD PRESSURE: 80 MMHG | HEIGHT: 69 IN | WEIGHT: 235.25 LBS | SYSTOLIC BLOOD PRESSURE: 122 MMHG | TEMPERATURE: 98 F

## 2022-06-15 DIAGNOSIS — K21.9 GASTROESOPHAGEAL REFLUX DISEASE WITHOUT ESOPHAGITIS: ICD-10-CM

## 2022-06-15 DIAGNOSIS — Z23 NEED FOR TD VACCINE: ICD-10-CM

## 2022-06-15 DIAGNOSIS — F41.9 ANXIETY: ICD-10-CM

## 2022-06-15 DIAGNOSIS — N43.2 OTHER HYDROCELE: ICD-10-CM

## 2022-06-15 DIAGNOSIS — Z11.59 NEED FOR HEPATITIS C SCREENING TEST: ICD-10-CM

## 2022-06-15 DIAGNOSIS — Z11.4 ENCOUNTER FOR SCREENING FOR HIV: Primary | ICD-10-CM

## 2022-06-15 DIAGNOSIS — Z13.1 SCREENING FOR DIABETES MELLITUS: ICD-10-CM

## 2022-06-15 DIAGNOSIS — N50.3 EPIDIDYMAL CYST: ICD-10-CM

## 2022-06-15 PROBLEM — R68.84 JAW PAIN: Status: RESOLVED | Noted: 2020-09-29 | Resolved: 2022-06-15

## 2022-06-15 PROBLEM — R10.13 EPIGASTRIC PAIN: Status: RESOLVED | Noted: 2020-04-14 | Resolved: 2022-06-15

## 2022-06-15 LAB
BILIRUB SERPL-MCNC: NEGATIVE MG/DL
BLOOD URINE, POC: ABNORMAL
CLARITY, POC UA: CLEAR
COLOR, POC UA: YELLOW
GLUCOSE UR QL STRIP: 250
KETONES UR QL STRIP: NEGATIVE
LEUKOCYTE ESTERASE URINE, POC: NEGATIVE
NITRITE, POC UA: NEGATIVE
PH, POC UA: 5
PROTEIN, POC: ABNORMAL
SPECIFIC GRAVITY, POC UA: 1.02
UROBILINOGEN, POC UA: NORMAL

## 2022-06-15 PROCEDURE — 99395 PR PREVENTIVE VISIT,EST,18-39: ICD-10-PCS | Mod: 25,S$GLB,, | Performed by: FAMILY MEDICINE

## 2022-06-15 PROCEDURE — 90714 TD VACCINE GREATER THAN OR EQUAL TO 7YO PRESERVATIVE FREE IM: ICD-10-PCS | Mod: S$GLB,,, | Performed by: FAMILY MEDICINE

## 2022-06-15 PROCEDURE — 90471 IMMUNIZATION ADMIN: CPT | Mod: S$GLB,,, | Performed by: FAMILY MEDICINE

## 2022-06-15 PROCEDURE — 3079F PR MOST RECENT DIASTOLIC BLOOD PRESSURE 80-89 MM HG: ICD-10-PCS | Mod: CPTII,S$GLB,, | Performed by: FAMILY MEDICINE

## 2022-06-15 PROCEDURE — 99999 PR PBB SHADOW E&M-EST. PATIENT-LVL III: CPT | Mod: PBBFAC,,, | Performed by: FAMILY MEDICINE

## 2022-06-15 PROCEDURE — 90471 TD VACCINE GREATER THAN OR EQUAL TO 7YO PRESERVATIVE FREE IM: ICD-10-PCS | Mod: S$GLB,,, | Performed by: FAMILY MEDICINE

## 2022-06-15 PROCEDURE — 3008F PR BODY MASS INDEX (BMI) DOCUMENTED: ICD-10-PCS | Mod: CPTII,S$GLB,, | Performed by: FAMILY MEDICINE

## 2022-06-15 PROCEDURE — 90714 TD VACC NO PRESV 7 YRS+ IM: CPT | Mod: S$GLB,,, | Performed by: FAMILY MEDICINE

## 2022-06-15 PROCEDURE — 99999 PR PBB SHADOW E&M-EST. PATIENT-LVL III: ICD-10-PCS | Mod: PBBFAC,,, | Performed by: FAMILY MEDICINE

## 2022-06-15 PROCEDURE — 1159F MED LIST DOCD IN RCRD: CPT | Mod: CPTII,S$GLB,, | Performed by: FAMILY MEDICINE

## 2022-06-15 PROCEDURE — 3079F DIAST BP 80-89 MM HG: CPT | Mod: CPTII,S$GLB,, | Performed by: FAMILY MEDICINE

## 2022-06-15 PROCEDURE — 3074F PR MOST RECENT SYSTOLIC BLOOD PRESSURE < 130 MM HG: ICD-10-PCS | Mod: CPTII,S$GLB,, | Performed by: FAMILY MEDICINE

## 2022-06-15 PROCEDURE — 1159F PR MEDICATION LIST DOCUMENTED IN MEDICAL RECORD: ICD-10-PCS | Mod: CPTII,S$GLB,, | Performed by: FAMILY MEDICINE

## 2022-06-15 PROCEDURE — 81002 URINALYSIS NONAUTO W/O SCOPE: CPT | Mod: S$GLB,,, | Performed by: FAMILY MEDICINE

## 2022-06-15 PROCEDURE — 81002 POCT URINE DIPSTICK WITHOUT MICROSCOPE: ICD-10-PCS | Mod: S$GLB,,, | Performed by: FAMILY MEDICINE

## 2022-06-15 PROCEDURE — 99395 PREV VISIT EST AGE 18-39: CPT | Mod: 25,S$GLB,, | Performed by: FAMILY MEDICINE

## 2022-06-15 PROCEDURE — 3074F SYST BP LT 130 MM HG: CPT | Mod: CPTII,S$GLB,, | Performed by: FAMILY MEDICINE

## 2022-06-15 PROCEDURE — 3008F BODY MASS INDEX DOCD: CPT | Mod: CPTII,S$GLB,, | Performed by: FAMILY MEDICINE

## 2022-06-15 NOTE — PROGRESS NOTES
Subjective:       Patient ID: Willie Luke is a 31 y.o. male.    Chief Complaint: Annual Exam    HPI: 32 yo WM--eating well---+BM--the +ambulating well--anxiety doing well--GERD still gets heartburn but not nearly as severe is use to be    ROS:  Skin: no psoriasis, eczema, skin cancer  HEENT: No headache, ocular pain, blurred vision, diplopia, epistaxis, hoarseness change in voice, thyroid trouble   Lung: No pneumonia, asthma, Tb, wheezing, SOB, no smoking   Heart: No chest pain, ankle edema, palpitations, MI, heron murmur, hypertension, hyperlipidemia  Abdomen:  +history GERD +  Comes and, no  vomiting, diarrhea, constipation, ulcers, hepatitis, gallbladder disease, melena, hematochezia, hematemesis  : no UTI, renal disease, stones no venereal disease  MS: no fractures, O/A, lupus, rheumatoid, gout  Neuro: No dizziness, LOC, seizures   No diabetes, no anemia, no  anxiety, no depression   Single, no children, work  [employer],lives with mother and sister   Objective:   Physical Exam:   General: Well nourished, well developed, no acute distress +obesity  Skin: No lesions  HEENT: Eyes PERRLA, EOM intact, nose patent, throat nonerythematous ears--  TMs clear   NECK: Supple, no bruits, No JVD, no nodes  Lungs: Clear, no rales, rhonchi, wheezing  Heart: Regular rate and rhythm, no murmurs, gallops, or rubs  Abdomen: flat, bowel sounds positive, no tenderness, or organomegaly  [id number] normal [id number] hydrocele left testicle posterior aspect  MS: Range of motion and muscle strength intact full range of motion of the back  Neuro: Alert, CN intact, oriented X 3  Extremities: No cyanosis, clubbing, or edema         Assessment:       1. Encounter for screening for HIV    2. Need for hepatitis C screening test    3. Need for Td vaccine    4. Gastroesophageal reflux disease without esophagitis    5. Anxiety    6. Epididymal cyst    7. Other hydrocele        Plan:       Encounter for  screening for HIV  -     HIV 1/2 Ag/Ab (4th Gen); Future; Expected date: 06/15/2022    Need for hepatitis C screening test  -     Hepatitis C Antibody; Future; Expected date: 06/15/2022    Need for Td vaccine  -     (In Office Administered) Td Vaccine - Preservative Free    Gastroesophageal reflux disease without esophagitis    Anxiety  -     CBC Auto Differential; Future; Expected date: 06/15/2022  -     Comprehensive Metabolic Panel; Future; Expected date: 06/15/2022  -     Lipid Panel; Future; Expected date: 06/15/2022  -     POCT urine dipstick without microscope  -     TSH; Future; Expected date: 06/15/2022  -     T4, Free; Future; Expected date: 06/15/2022    Epididymal cyst    Other hydrocele       main Reason for Visit  Annual exam  History of GERD doing well on omeprazole--no significant problems at this time--told if has a flare to avoid caffeine alcohol smoking NSAIDs carbonated drinks and stress  History of jaw pain was from wisdom teeth has subsided  History of anxiety-subsided  History of left  testicular hydrocele--had ultrasound in the past--states lesion is not increased in size  Lab CBCs CMP lipids T4 TSH UA  Health maintenance hepatitis C COVID HIV test at

## 2023-08-25 ENCOUNTER — TELEPHONE (OUTPATIENT)
Dept: PRIMARY CARE CLINIC | Facility: CLINIC | Age: 33
End: 2023-08-25
Payer: COMMERCIAL

## 2023-08-25 DIAGNOSIS — K21.9 GASTROESOPHAGEAL REFLUX DISEASE WITHOUT ESOPHAGITIS: ICD-10-CM

## 2023-08-25 DIAGNOSIS — F41.9 ANXIETY: Primary | ICD-10-CM

## 2023-08-25 NOTE — TELEPHONE ENCOUNTER
----- Message from Lit Baez MA sent at 8/24/2023  4:35 PM CDT -----  Contact: self [phone]    ----- Message -----  From: Elie Mejía  Sent: 8/24/2023  10:47 AM CDT  To: [facility] Staff    Pt requesting annual labs prior to 10/23 appt.    Please call and advise

## 2023-08-25 NOTE — TELEPHONE ENCOUNTER
Called patient regarding message. Patient requesting if you can add his labs before his annual appointment. Can you please add labs, thank you.

## 2023-09-18 ENCOUNTER — PATIENT MESSAGE (OUTPATIENT)
Dept: PRIMARY CARE CLINIC | Facility: CLINIC | Age: 33
End: 2023-09-18
Payer: COMMERCIAL

## 2023-10-25 ENCOUNTER — OFFICE VISIT (OUTPATIENT)
Dept: PRIMARY CARE CLINIC | Facility: CLINIC | Age: 33
End: 2023-10-25
Payer: COMMERCIAL

## 2023-10-25 VITALS
OXYGEN SATURATION: 98 % | HEART RATE: 60 BPM | RESPIRATION RATE: 18 BRPM | WEIGHT: 211.56 LBS | HEIGHT: 69 IN | SYSTOLIC BLOOD PRESSURE: 120 MMHG | BODY MASS INDEX: 31.33 KG/M2 | DIASTOLIC BLOOD PRESSURE: 88 MMHG | TEMPERATURE: 97 F

## 2023-10-25 DIAGNOSIS — K21.9 GASTROESOPHAGEAL REFLUX DISEASE WITHOUT ESOPHAGITIS: Primary | ICD-10-CM

## 2023-10-25 DIAGNOSIS — F41.9 ANXIETY: ICD-10-CM

## 2023-10-25 PROCEDURE — 3008F BODY MASS INDEX DOCD: CPT | Mod: CPTII,S$GLB,, | Performed by: FAMILY MEDICINE

## 2023-10-25 PROCEDURE — 99395 PR PREVENTIVE VISIT,EST,18-39: ICD-10-PCS | Mod: S$GLB,,, | Performed by: FAMILY MEDICINE

## 2023-10-25 PROCEDURE — 99999 PR PBB SHADOW E&M-EST. PATIENT-LVL III: ICD-10-PCS | Mod: PBBFAC,,, | Performed by: FAMILY MEDICINE

## 2023-10-25 PROCEDURE — 3074F PR MOST RECENT SYSTOLIC BLOOD PRESSURE < 130 MM HG: ICD-10-PCS | Mod: CPTII,S$GLB,, | Performed by: FAMILY MEDICINE

## 2023-10-25 PROCEDURE — 99999 PR PBB SHADOW E&M-EST. PATIENT-LVL III: CPT | Mod: PBBFAC,,, | Performed by: FAMILY MEDICINE

## 2023-10-25 PROCEDURE — 99395 PREV VISIT EST AGE 18-39: CPT | Mod: S$GLB,,, | Performed by: FAMILY MEDICINE

## 2023-10-25 PROCEDURE — 3074F SYST BP LT 130 MM HG: CPT | Mod: CPTII,S$GLB,, | Performed by: FAMILY MEDICINE

## 2023-10-25 PROCEDURE — 3008F PR BODY MASS INDEX (BMI) DOCUMENTED: ICD-10-PCS | Mod: CPTII,S$GLB,, | Performed by: FAMILY MEDICINE

## 2023-10-25 PROCEDURE — 3079F PR MOST RECENT DIASTOLIC BLOOD PRESSURE 80-89 MM HG: ICD-10-PCS | Mod: CPTII,S$GLB,, | Performed by: FAMILY MEDICINE

## 2023-10-25 PROCEDURE — 3079F DIAST BP 80-89 MM HG: CPT | Mod: CPTII,S$GLB,, | Performed by: FAMILY MEDICINE

## 2023-10-25 PROCEDURE — 1159F MED LIST DOCD IN RCRD: CPT | Mod: CPTII,S$GLB,, | Performed by: FAMILY MEDICINE

## 2023-10-25 PROCEDURE — 1159F PR MEDICATION LIST DOCUMENTED IN MEDICAL RECORD: ICD-10-PCS | Mod: CPTII,S$GLB,, | Performed by: FAMILY MEDICINE

## 2023-10-25 NOTE — PROGRESS NOTES
Subjective:       Patient ID: Willie Luke is a 33 y.o. male.    Chief Complaint: Annual Exam    HPI: 34 yo WM-in for annual physical---eating well---+BM-- +ambulating well--anxiety gone -GERD still gets heartburn but much better with weight loss     ROS:  Skin: no psoriasis, eczema, skin cancer  HEENT: No headache, ocular pain, blurred vision, diplopia, epistaxis, hoarseness change in voice, thyroid trouble   Lung: No pneumonia, asthma, Tb, wheezing, SOB, no smoking   Heart: No chest pain, ankle edema, palpitations, MI, heron murmur, hypertension, hyperlipidemia  Abdomen:  +history GERD- better , no  vomiting, diarrhea, constipation, ulcers, hepatitis, gallbladder disease, melena, hematochezia, hematemesis  : no UTI, renal disease, stones no venereal disease  MS: no fractures, O/A, lupus, rheumatoid, gout  Neuro: No dizziness, LOC, seizures   No diabetes, no anemia, no  anxiety, no depression --thinks covid makes nervous  Single, no children, work  [employer],lives with mother   Objective:   Physical Exam:   General: Well nourished, well developed, no acute distress +obesity  Skin: No lesions  HEENT: Eyes PERRLA, EOM intact, nose patent, throat nonerythematous ears--  TMs clear   NECK: Supple, no bruits, No JVD, no nodes  Lungs: Clear, no rales, rhonchi, wheezing  Heart: Regular rate and rhythm, no murmurs, gallops, or rubs  Abdomen: flat, bowel sounds positive, no tenderness, or organomegaly  [id number] normal [id number] hydrocele left testicle posterior aspect  MS: Range of motion and muscle strength intact full range of motion of the back  Neuro: Alert, CN intact, oriented X 3  Extremities: No cyanosis, clubbing, or edema         Assessment:       1. Gastroesophageal reflux disease without esophagitis    2. Anxiety          Plan:       Gastroesophageal reflux disease without esophagitis    Anxiety         main Reason for Visit  Annual exam  History of GERD doing well with  weight   History of anxiety-subsided  History of left  testicular hydrocele--had ultrasound in the past--doing well   Lab CBCs CMP lipids T4 TSH --WNL no new tx redo 1 year

## 2025-08-19 ENCOUNTER — OFFICE VISIT (OUTPATIENT)
Dept: PRIMARY CARE CLINIC | Facility: CLINIC | Age: 35
End: 2025-08-19
Payer: COMMERCIAL

## 2025-08-19 VITALS
TEMPERATURE: 99 F | SYSTOLIC BLOOD PRESSURE: 132 MMHG | BODY MASS INDEX: 34.53 KG/M2 | HEART RATE: 82 BPM | DIASTOLIC BLOOD PRESSURE: 86 MMHG | OXYGEN SATURATION: 100 % | WEIGHT: 233.81 LBS | RESPIRATION RATE: 14 BRPM

## 2025-08-19 DIAGNOSIS — K21.9 GASTROESOPHAGEAL REFLUX DISEASE WITHOUT ESOPHAGITIS: Primary | ICD-10-CM

## 2025-08-19 DIAGNOSIS — F41.9 ANXIETY: ICD-10-CM

## 2025-08-19 PROCEDURE — 99999 PR PBB SHADOW E&M-EST. PATIENT-LVL III: CPT | Mod: PBBFAC,,, | Performed by: FAMILY MEDICINE

## 2025-08-19 RX ORDER — BETAMETHASONE VALERATE 1 MG/G
CREAM TOPICAL 2 TIMES DAILY
Qty: 60 G | Refills: 2 | Status: SHIPPED | OUTPATIENT
Start: 2025-08-19